# Patient Record
Sex: MALE | Race: BLACK OR AFRICAN AMERICAN | NOT HISPANIC OR LATINO | URBAN - METROPOLITAN AREA
[De-identification: names, ages, dates, MRNs, and addresses within clinical notes are randomized per-mention and may not be internally consistent; named-entity substitution may affect disease eponyms.]

---
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
## 2020-05-02 NOTE — ED PROVIDER NOTE - RATE
CC of non-radiating L sided chest pain x 3 days on-and-off,  accompanied with SOB worst during exertion 101

## 2024-04-28 NOTE — ED ADULT NURSE NOTE - PRO INTERPRETER NEED 2
Problem: Fluid Volume Excess  Goal: Fluid Volume Excess Symptoms Resolved  Description: Treatment often consists of oxygen and respiratory support with diuretic therapy at doses that exceed usual dose (typically doubled).  Monitor patient response to treatment.    Acute dyspnea should resolve quickly if dose is adequate and kidney function is adequate. Dyspnea/SOB should only be observed with Activity after effective treatment. Patient should be able to lie down comfortably, without SOB.  Outcome: Monitoring/Evaluating progress  Goal: Oxygenation is maintained (SpO2 greater than or equal to 90% or as ordered)  Outcome: Monitoring/Evaluating progress  Goal: Verbalizes understanding of FVE management plan  Description: Document on Patient Education Activity  Outcome: Monitoring/Evaluating progress     Problem: Impaired Physical Mobility  Goal: Functional status is maintained or returned to baseline during hospitalization  Outcome: Monitoring/Evaluating progress  Goal: Tolerates activity for discharge setting with no abnormal symptoms  Outcome: Monitoring/Evaluating progress     Problem: At Risk for Falls  Goal: Patient does not fall  Outcome: Monitoring/Evaluating progress  Goal: Patient takes action to control fall-related risks  Outcome: Monitoring/Evaluating progress     Problem: At Risk for Injury Due to Fall  Goal: Patient does not fall  Outcome: Monitoring/Evaluating progress  Goal: Takes action to control condition specific risks  Outcome: Monitoring/Evaluating progress  Goal: Verbalizes understanding of fall-related injury personal risks  Description: Document education using the patient education activity  Outcome: Monitoring/Evaluating progress     Problem: Diabetes  Goal: Achieves glycemic balance  Description: Goal is to maintain blood sugar within range with no episodes of hypoglycemia  Outcome: Monitoring/Evaluating progress  Goal: Verbalizes/demonstrates understanding of NEW diagnosis of diabetes and  management  Description: Document on Patient Education Activity  Outcome: Monitoring/Evaluating progress  Goal: Verbalizes understanding of diabetes management including how to use HbA1C to evaluate status of blood sugar over time (Diabetes is NOT a new diagnosis)  Description: Diabetes Education  Outcome: Monitoring/Evaluating progress  Goal: Demonstrates ability to self-administer insulin  Description: Document on Patient Education Activity  Outcome: Monitoring/Evaluating progress     Problem: Cardiac Rhythm Disturbances with or without Devices  Goal: Hemodynamic stability achieved/maintained  Outcome: Monitoring/Evaluating progress  Goal: Anxiety is controlled  Outcome: Monitoring/Evaluating progress  Goal: Participates in ADL/Activity without s/s of intolerance  Outcome: Monitoring/Evaluating progress  Goal: Urinary elimination pattern returned to baseline  Description: Patient must be making adequate amounts of urine output (240cc/8 hours) and voiding. If indwelling urinary catheter: Remove asap (no later an Day 2 or provider must specify reason for continued use).  Outcome: Monitoring/Evaluating progress  Goal: Verbalizes understanding of rhythm disturbance, treatment procedure and pre, post-, and d/c care specific to intervention  Description: Document on Patient Education Activity  Outcome: Monitoring/Evaluating progress      English